# Patient Record
Sex: MALE | Race: WHITE | NOT HISPANIC OR LATINO | Employment: FULL TIME | URBAN - NONMETROPOLITAN AREA
[De-identification: names, ages, dates, MRNs, and addresses within clinical notes are randomized per-mention and may not be internally consistent; named-entity substitution may affect disease eponyms.]

---

## 2023-12-01 ENCOUNTER — OFFICE VISIT (OUTPATIENT)
Dept: AUDIOLOGY | Facility: CLINIC | Age: 63
End: 2023-12-01
Payer: COMMERCIAL

## 2023-12-01 DIAGNOSIS — H90.3 SENSORY HEARING LOSS, BILATERAL: Primary | ICD-10-CM

## 2023-12-01 PROCEDURE — 92567 TYMPANOMETRY: CPT | Performed by: AUDIOLOGIST-HEARING AID FITTER

## 2023-12-01 PROCEDURE — 92557 COMPREHENSIVE HEARING TEST: CPT | Performed by: AUDIOLOGIST-HEARING AID FITTER

## 2023-12-01 NOTE — PROGRESS NOTES
HEARING EVALUATION    Name:  Asha Larson  :  1960  Age:  66 y.o. MRN:  [id number]  Date of Evaluation: 23     History: Audiogram  Reason for visit: Asha Larson is being seen today for an evaluation of hearing. Finis Liner reported a gradual decrease in hearing sensitivity. He reported a bilateral intermittent tinnitus. There is a history of loud noise exposure. EVALUATION:    Otoscopic Evaluation:   Right Ear: Unremarkable, canal clear   Left Ear: Unremarkable, canal clear    Tympanometry:   Right: Type A - normal middle ear pressure and compliance   Left: Type A - normal middle ear pressure and compliance    Audiogram Results:  Pure tone testing revealed a mild sloping to moderately severe sensorineural hearing loss bilaterally. SRT and PTA are in agreement indicating good test reliability. Word recognition scores were excellent bilaterally.      *see attached audiogram      RECOMMENDATIONS:  Annual hearing eval, Return to [facility]. for F/U, and Hearing Aid Evaluation      Ruben Byrnes, CCC-A  Clinical Audiologist

## 2023-12-05 ENCOUNTER — OFFICE VISIT (OUTPATIENT)
Dept: AUDIOLOGY | Facility: CLINIC | Age: 63
End: 2023-12-05

## 2023-12-05 DIAGNOSIS — H90.3 SENSORY HEARING LOSS, BILATERAL: Primary | ICD-10-CM

## 2023-12-05 NOTE — PROGRESS NOTES
Hearing Aid Evaluation  Name:  Nani Kelly  :  1960  Age:  66 y.o. MRN:  [id number]  Date of Evaluation: 23     Nani Kelly was seen today for a hearing aid evaluation. See attached quote sheet*    Hearing aid Selection:     The patient selected the Oticon Real 3 miniRITE-R hearing aids. Level: Intermediate   Color: chroma beige     size: Right 2-85 / Left 2-85   Dome size: 8mm OB   Accessories:      Patient will call with his decision.      Ruben Cross, CCC-A  Clinical Audiologist  [address]

## 2023-12-15 ENCOUNTER — OFFICE VISIT (OUTPATIENT)
Dept: AUDIOLOGY | Facility: CLINIC | Age: 63
End: 2023-12-15
Payer: COMMERCIAL

## 2023-12-15 DIAGNOSIS — H90.3 SENSORY HEARING LOSS, BILATERAL: Primary | ICD-10-CM

## 2023-12-15 PROCEDURE — V5261 HEARING AID, DIGIT, BIN, BTE: HCPCS | Performed by: AUDIOLOGIST-HEARING AID FITTER

## 2023-12-15 PROCEDURE — V5160 DISPENSING FEE BINAURAL: HCPCS | Performed by: AUDIOLOGIST-HEARING AID FITTER

## 2023-12-15 NOTE — PROGRESS NOTES
Hearing Aid Fitting    Name:  Jagjit Camilo  :  1960  Age:  66 y.o. MRN:  [id number]  Date of Evaluation: 12/15/23     Jagjit Camilo was seen today for a binaural hearing aid fitting of his Oticon Real 2 miniRITEhearing aid(s). Mr. Clara Schroeder was unaccompanied to today's visit. Hearing aid purchase is being paid by Private Pay and an insurance benefit. Patient is being fit with Oticon Real 2 miniRITE-R hearing aid(s). Right serial number [id number]. Left serial number [id number]. Warranty date: 27 (Loss/Damage and repair). Dome/Earmold: 8mm OB : Right 3-85 / Left 3-85    Initial Hearing Aid Settings:    Hearing aid(s) were programmed using [employer]'s fitting formula and were adjusted based on Mr. Clara Schroeder perceived comfort level. Hearing aid(s) were set to experience level 1  per  Matt Yesi subjective listening preference. Patient noted good sound quality, and was happy with the overall sound quality and fit of the hearing aid(s). Hearing aid Orientation:    Mr. Clara Schroeder was counseled on device components and component function. Proper insertion and removal of the aid(s) were demonstrated. The Patient practiced insertion and removal of the devices in the office, they demonstrated excellent ability to manipulate the hearing aids. Mr. Clara Schroeder was given the devices users manual that reviews aid usage and operation, hearing aid cleaning tools, and hearing aid carrying case. The hearing aid warranty, including unlimited repair and a one time loss and damage per hearing aid, through the , as well as [facility] hearing aid service plan, including unlimited office visits, and supplies were outlined thoroughly. Mr. Clara Schroeder agreed to the terms of sale listed on the purchase agreement containing device specifications, warranties, pricing information, as well as [facility] 45-day trial period timeline. After this period has elapsed, hearing aids cannot be returned.       Hearing aid expectations:   Hearing aids are assistive devices and are not designed to restore normal hearing sensitivity. The importance of realistic expectations, especially in the presence of background noise, was emphasized. The need for daily, consistent usage (8-12 hours per day) for proper device adjustment, as well as the importance of self-advocacy and practicing effective communication strategies was outlined. Effective communication strategies include:  1.) Maintaining face-to-face communication, allowing for speechreading of facial expressions, lips, and gestures. 2.) Reducing background noise and distance between communication partners. 3.) Having communication partners reduce their rate of speech when appropriate. 4.) Beginning conversation by getting communication partner's attention. 5.) Asking for rephrasing of missed aspects of conversation rather than asking for repetition. Recommendations & Follow-up:  Mr. Kp Link demonstrated understanding of all the topics discussed. Mr. Kp Link is to follow-up in 2-3 weeks for a hearing aid check within the trial period as scheduled. $3100.00 paid today through care credit. Billing $1500.00 to insurance.      Ruben Henry, [facility]  Clinical Audiologist  [address]

## 2023-12-29 ENCOUNTER — OFFICE VISIT (OUTPATIENT)
Dept: AUDIOLOGY | Facility: CLINIC | Age: 63
End: 2023-12-29

## 2023-12-29 DIAGNOSIS — H90.3 SENSORY HEARING LOSS, BILATERAL: Primary | ICD-10-CM

## 2023-12-29 NOTE — PROGRESS NOTES
Hearing Aid Visit:    Name:  Jaime Velásquez  :  1960  Age:  63 y.o.  MRN:  [id number]  Date of Evaluation: 23     Jaime Velásquez was seen today for a 2 week follow up. He is doing well overall.     Device Information:  Patient is being fit with [employer] Real 2 miniRITE-R hearing aid(s).   Right serial number [id number]. Left serial number [id number].   Warranty date: 27 (Loss/Damage and repair).   Dome/Earmold: 8mm OB : Right 3-85 / Left 3-85    Action:  Adjusted noise management. Decreased loud gain and increased soft gain.     Recommendations:   Return Ruben Chow, CCC-A  Clinical Audiologist  [facility] [place]

## 2024-10-31 ENCOUNTER — OFFICE VISIT (OUTPATIENT)
Dept: AUDIOLOGY | Facility: CLINIC | Age: 64
End: 2024-10-31

## 2024-10-31 DIAGNOSIS — H90.3 SENSORY HEARING LOSS, BILATERAL: Primary | ICD-10-CM

## 2024-10-31 NOTE — PROGRESS NOTES
Progress Note    Name:  Jaime Velásquez  :  1960  Age:  64 y.o.  MRN:  [id number]  Date of Evaluation: 10/31/24     Patient picked up supplies    Ruben Dubois CCC-A  Clinical Audiologist

## 2025-04-29 ENCOUNTER — OFFICE VISIT (OUTPATIENT)
Dept: AUDIOLOGY | Facility: CLINIC | Age: 65
End: 2025-04-29

## 2025-04-29 DIAGNOSIS — H90.3 SENSORY HEARING LOSS, BILATERAL: Primary | ICD-10-CM

## 2025-04-29 NOTE — PROGRESS NOTES
Progress Note    Name:  Jaime Velásquez  :  1960  Age:  65 y.o.  MRN:  [id number]  Date of Evaluation: 25     Patient picked up supplies.    Ruben Dubois, CCC-A  Clinical Audiologist